# Patient Record
Sex: FEMALE | Race: WHITE | NOT HISPANIC OR LATINO
[De-identification: names, ages, dates, MRNs, and addresses within clinical notes are randomized per-mention and may not be internally consistent; named-entity substitution may affect disease eponyms.]

---
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
## 2021-02-28 ENCOUNTER — APPOINTMENT (OUTPATIENT)
Dept: MRI IMAGING | Facility: HOSPITAL | Age: 21
End: 2021-02-28
Payer: COMMERCIAL

## 2021-02-28 ENCOUNTER — OUTPATIENT (OUTPATIENT)
Dept: OUTPATIENT SERVICES | Age: 21
LOS: 1 days | End: 2021-02-28

## 2021-02-28 DIAGNOSIS — G93.5 COMPRESSION OF BRAIN: ICD-10-CM

## 2021-02-28 PROCEDURE — 72141 MRI NECK SPINE W/O DYE: CPT | Mod: 26

## 2021-02-28 PROCEDURE — 72146 MRI CHEST SPINE W/O DYE: CPT | Mod: 26

## 2021-05-15 ENCOUNTER — OUTPATIENT (OUTPATIENT)
Dept: OUTPATIENT SERVICES | Age: 21
LOS: 1 days | End: 2021-05-15

## 2021-05-15 VITALS
DIASTOLIC BLOOD PRESSURE: 73 MMHG | HEART RATE: 60 BPM | RESPIRATION RATE: 18 BRPM | OXYGEN SATURATION: 98 % | WEIGHT: 103.4 LBS | SYSTOLIC BLOOD PRESSURE: 113 MMHG | HEIGHT: 61.57 IN

## 2021-05-15 DIAGNOSIS — Z98.890 OTHER SPECIFIED POSTPROCEDURAL STATES: Chronic | ICD-10-CM

## 2021-05-15 DIAGNOSIS — Z86.69 PERSONAL HISTORY OF OTHER DISEASES OF THE NERVOUS SYSTEM AND SENSE ORGANS: ICD-10-CM

## 2021-05-15 DIAGNOSIS — Z92.89 PERSONAL HISTORY OF OTHER MEDICAL TREATMENT: Chronic | ICD-10-CM

## 2021-05-15 DIAGNOSIS — G91.9 HYDROCEPHALUS, UNSPECIFIED: ICD-10-CM

## 2021-05-15 LAB
ANION GAP SERPL CALC-SCNC: 10 MMOL/L — SIGNIFICANT CHANGE UP (ref 7–14)
BLD GP AB SCN SERPL QL: NEGATIVE — SIGNIFICANT CHANGE UP
BUN SERPL-MCNC: 11 MG/DL — SIGNIFICANT CHANGE UP (ref 7–23)
CALCIUM SERPL-MCNC: 9.8 MG/DL — SIGNIFICANT CHANGE UP (ref 8.4–10.5)
CHLORIDE SERPL-SCNC: 102 MMOL/L — SIGNIFICANT CHANGE UP (ref 98–107)
CO2 SERPL-SCNC: 26 MMOL/L — SIGNIFICANT CHANGE UP (ref 22–31)
CREAT SERPL-MCNC: 0.79 MG/DL — SIGNIFICANT CHANGE UP (ref 0.5–1.3)
GLUCOSE SERPL-MCNC: 85 MG/DL — SIGNIFICANT CHANGE UP (ref 70–99)
HCG SERPL-ACNC: <5 MIU/ML — SIGNIFICANT CHANGE UP
HCT VFR BLD CALC: 40.2 % — SIGNIFICANT CHANGE UP (ref 34.5–45)
HGB BLD-MCNC: 13.2 G/DL — SIGNIFICANT CHANGE UP (ref 11.5–15.5)
MCHC RBC-ENTMCNC: 29.3 PG — SIGNIFICANT CHANGE UP (ref 27–34)
MCHC RBC-ENTMCNC: 32.8 GM/DL — SIGNIFICANT CHANGE UP (ref 32–36)
MCV RBC AUTO: 89.3 FL — SIGNIFICANT CHANGE UP (ref 80–100)
NRBC # BLD: 0 /100 WBCS — SIGNIFICANT CHANGE UP
NRBC # FLD: 0 K/UL — SIGNIFICANT CHANGE UP
PLATELET # BLD AUTO: 337 K/UL — SIGNIFICANT CHANGE UP (ref 150–400)
POTASSIUM SERPL-MCNC: 4.5 MMOL/L — SIGNIFICANT CHANGE UP (ref 3.5–5.3)
POTASSIUM SERPL-SCNC: 4.5 MMOL/L — SIGNIFICANT CHANGE UP (ref 3.5–5.3)
RBC # BLD: 4.5 M/UL — SIGNIFICANT CHANGE UP (ref 3.8–5.2)
RBC # FLD: 12.4 % — SIGNIFICANT CHANGE UP (ref 10.3–14.5)
RH IG SCN BLD-IMP: POSITIVE — SIGNIFICANT CHANGE UP
SODIUM SERPL-SCNC: 138 MMOL/L — SIGNIFICANT CHANGE UP (ref 135–145)
WBC # BLD: 5.94 K/UL — SIGNIFICANT CHANGE UP (ref 3.8–10.5)
WBC # FLD AUTO: 5.94 K/UL — SIGNIFICANT CHANGE UP (ref 3.8–10.5)

## 2021-05-15 RX ORDER — ALBUTEROL 90 UG/1
2 AEROSOL, METERED ORAL
Qty: 0 | Refills: 0 | DISCHARGE

## 2021-05-15 RX ORDER — BUPROPION HYDROCHLORIDE 150 MG/1
1 TABLET, EXTENDED RELEASE ORAL
Qty: 0 | Refills: 0 | DISCHARGE

## 2021-05-15 NOTE — H&P PST ADULT - NSICDXPASTMEDICALHX_GEN_ALL_CORE_FT
PAST MEDICAL HISTORY:  Anxiety     Exercise-induced asthma     History of Chiari malformation     History of migraine headaches

## 2021-05-15 NOTE — H&P PST ADULT - NOTES
Family History: Mother no pmh, tubal ligation, Father- ACL repair, Brother 27yo- PFO, no psh , Sister 21yo- no pmh, no psh. MGM- htn, high choles., hip replacement, MGF- htn, DM, high chol., PSM- - cancer, PGF- hernia repair.  Maternal aunt- PE at age 30.   No family history of bleeding disorders or anesthesia complications

## 2021-05-15 NOTE — H&P PST ADULT - RADIOLOGY RESULTS AND INTERPRETATION
IMPRESSION:    Minimal prominence of the central canal of the mid-lower cervical and upper thoracic cord is noted, without focal discrete syrinx formation.              DEAN ALARCON MD; Resident Radiology  This document has been electronically signed.  JOSE ZAIDI MD; Attending Radiologist  This document has been electronically signed. Mar  1 2021  3:30PM

## 2021-05-15 NOTE — H&P PST ADULT - PRO ANTICIPATED DISCH DISP
home Acitretin Pregnancy And Lactation Text: This medication is Pregnancy Category X and should not be given to women who are pregnant or may become pregnant in the future. This medication is excreted in breast milk.

## 2021-05-15 NOTE — H&P PST ADULT - REASON FOR ADMISSION
Here today for presurgical assessment prior to suboccipital craniectomy, C1 laminectomy for external CHiari decompression [place]25[place]2021 [place] [facility] [place] today for presurgical assessment prior to suboccipital craniectomy, C1 laminectomy for external [place]ari decompression [place]25[place]21 [place] [facility] [place]

## 2021-05-15 NOTE — H&P PST ADULT - NSICDXPASTSURGICALHX_GEN_ALL_CORE_FT
PAST SURGICAL HISTORY:  H/O surgical procedure mass removed from shoulder    History of dental surgery emergency delirium

## 2021-05-15 NOTE — H&P PST ADULT - HISTORY OF PRESENT ILLNESS
19yo here for PSt. She has a history of migraine headaches, anxiety, exercise induced asthma and a PFO which closed at approximately age 14yo. She had a brain MRI which was significant for a Chiari malformation. She was evaluated by Dr. Lopez and is now here prior to Chiari decompression.  In February 2021 she had spine MRI which showed no evidence os syrinx. She has had a mass removed her shoulder with no reported complications but she did experience symptoms consistent with emergence delirium after dental surgery.  No recent fever or s/s illness. No known exposure to Covid 19

## 2021-05-15 NOTE — H&P PST ADULT - NS PRO REFERRAL CMGT
This CCLS provided psychological preparation through pictures and explanation of hospital routines. This CCLS provided pt./family with information about admission to hospital. Pt. appeared to be coping well. Pt. verbalized developmentally appropriate understanding of day of surgery.

## 2021-05-15 NOTE — H&P PST ADULT - NSANTHOSAYNRD_GEN_A_CORE
No. DALE screening performed.  STOP BANG Legend: 0-2 = LOW Risk; 3-4 = INTERMEDIATE Risk; 5-8 = HIGH Risk

## 2021-05-15 NOTE — H&P PST ADULT - NSICDXPROBLEM_GEN_ALL_CORE_FT
PROBLEM DIAGNOSES  Problem: History of Chiari malformation  Assessment and Plan: Scheduled for suboccipital craniectomy, C1 Laminectomy for extradural Chiari decompression scheduled for 5/25/2021 at [facility] [place].  CBC, BMP, HCG and TYpe and Screen sent  Given urine cup to bring sample on DOS  COVID PCR scheduled for 5/22/21

## 2021-05-15 NOTE — H&P PST ADULT - CARDIOVASCULAR COMMENTS
History of PFO which spontaneously closed and seen by cardiology x 1 for palpitations- exam wnl, most likely due to panic attacks.

## 2021-05-22 ENCOUNTER — APPOINTMENT (OUTPATIENT)
Dept: DISASTER EMERGENCY | Facility: CLINIC | Age: 21
End: 2021-05-22

## 2021-05-22 DIAGNOSIS — Z01.818 ENCOUNTER FOR OTHER PREPROCEDURAL EXAMINATION: ICD-10-CM

## 2021-05-22 LAB — SARS-COV-2 N GENE NPH QL NAA+PROBE: NOT DETECTED

## 2021-05-24 ENCOUNTER — TRANSCRIPTION ENCOUNTER (OUTPATIENT)
Age: 21
End: 2021-05-24

## 2021-05-25 ENCOUNTER — TRANSCRIPTION ENCOUNTER (OUTPATIENT)
Age: 21
End: 2021-05-25

## 2021-05-25 ENCOUNTER — INPATIENT (INPATIENT)
Age: 21
LOS: 0 days | Discharge: HOME | End: 2021-05-26
Attending: NEUROLOGICAL SURGERY | Admitting: NEUROLOGICAL SURGERY
Payer: COMMERCIAL

## 2021-05-25 VITALS
RESPIRATION RATE: 20 BRPM | OXYGEN SATURATION: 100 % | TEMPERATURE: 99 F | DIASTOLIC BLOOD PRESSURE: 60 MMHG | SYSTOLIC BLOOD PRESSURE: 119 MMHG | WEIGHT: 103.4 LBS | HEIGHT: 61.57 IN | HEART RATE: 90 BPM

## 2021-05-25 DIAGNOSIS — Z92.89 PERSONAL HISTORY OF OTHER MEDICAL TREATMENT: Chronic | ICD-10-CM

## 2021-05-25 DIAGNOSIS — Z98.890 OTHER SPECIFIED POSTPROCEDURAL STATES: Chronic | ICD-10-CM

## 2021-05-25 DIAGNOSIS — G91.9 HYDROCEPHALUS, UNSPECIFIED: ICD-10-CM

## 2021-05-25 LAB
BASOPHILS # BLD AUTO: 0.02 K/UL — SIGNIFICANT CHANGE UP (ref 0–0.2)
BASOPHILS NFR BLD AUTO: 0.2 % — SIGNIFICANT CHANGE UP (ref 0–2)
EOSINOPHIL # BLD AUTO: 0 K/UL — SIGNIFICANT CHANGE UP (ref 0–0.5)
EOSINOPHIL NFR BLD AUTO: 0 % — SIGNIFICANT CHANGE UP (ref 0–6)
HCG UR QL: NEGATIVE — SIGNIFICANT CHANGE UP
HCT VFR BLD CALC: 34.2 % — LOW (ref 34.5–45)
HGB BLD-MCNC: 11.3 G/DL — LOW (ref 11.5–15.5)
IANC: 11.22 K/UL — HIGH (ref 1.5–8.5)
IMM GRANULOCYTES NFR BLD AUTO: 0.7 % — SIGNIFICANT CHANGE UP (ref 0–1.5)
LYMPHOCYTES # BLD AUTO: 0.7 K/UL — LOW (ref 1–3.3)
LYMPHOCYTES # BLD AUTO: 5.6 % — LOW (ref 13–44)
MCHC RBC-ENTMCNC: 29.4 PG — SIGNIFICANT CHANGE UP (ref 27–34)
MCHC RBC-ENTMCNC: 33 GM/DL — SIGNIFICANT CHANGE UP (ref 32–36)
MCV RBC AUTO: 89.1 FL — SIGNIFICANT CHANGE UP (ref 80–100)
MONOCYTES # BLD AUTO: 0.49 K/UL — SIGNIFICANT CHANGE UP (ref 0–0.9)
MONOCYTES NFR BLD AUTO: 3.9 % — SIGNIFICANT CHANGE UP (ref 2–14)
NEUTROPHILS # BLD AUTO: 11.22 K/UL — HIGH (ref 1.8–7.4)
NEUTROPHILS NFR BLD AUTO: 89.6 % — HIGH (ref 43–77)
NRBC # BLD: 0 /100 WBCS — SIGNIFICANT CHANGE UP
NRBC # FLD: 0 K/UL — SIGNIFICANT CHANGE UP
PLATELET # BLD AUTO: 258 K/UL — SIGNIFICANT CHANGE UP (ref 150–400)
RBC # BLD: 3.84 M/UL — SIGNIFICANT CHANGE UP (ref 3.8–5.2)
RBC # FLD: 12.2 % — SIGNIFICANT CHANGE UP (ref 10.3–14.5)
WBC # BLD: 12.52 K/UL — HIGH (ref 3.8–10.5)
WBC # FLD AUTO: 12.52 K/UL — HIGH (ref 3.8–10.5)

## 2021-05-25 PROCEDURE — 99291 CRITICAL CARE FIRST HOUR: CPT

## 2021-05-25 RX ORDER — ONDANSETRON 8 MG/1
4 TABLET, FILM COATED ORAL EVERY 8 HOURS
Refills: 0 | Status: DISCONTINUED | OUTPATIENT
Start: 2021-05-25 | End: 2021-05-25

## 2021-05-25 RX ORDER — SODIUM CHLORIDE 9 MG/ML
1000 INJECTION, SOLUTION INTRAVENOUS
Refills: 0 | Status: DISCONTINUED | OUTPATIENT
Start: 2021-05-25 | End: 2021-05-26

## 2021-05-25 RX ORDER — HYDROMORPHONE HYDROCHLORIDE 2 MG/ML
0.5 INJECTION INTRAMUSCULAR; INTRAVENOUS; SUBCUTANEOUS
Refills: 0 | Status: DISCONTINUED | OUTPATIENT
Start: 2021-05-25 | End: 2021-05-25

## 2021-05-25 RX ORDER — FENTANYL CITRATE 50 UG/ML
25 INJECTION INTRAVENOUS
Refills: 0 | Status: DISCONTINUED | OUTPATIENT
Start: 2021-05-25 | End: 2021-05-25

## 2021-05-25 RX ORDER — DIAZEPAM 5 MG
5 TABLET ORAL EVERY 6 HOURS
Refills: 0 | Status: DISCONTINUED | OUTPATIENT
Start: 2021-05-25 | End: 2021-05-26

## 2021-05-25 RX ORDER — ONDANSETRON 8 MG/1
4 TABLET, FILM COATED ORAL EVERY 8 HOURS
Refills: 0 | Status: DISCONTINUED | OUTPATIENT
Start: 2021-05-25 | End: 2021-05-26

## 2021-05-25 RX ORDER — ONDANSETRON 8 MG/1
4 TABLET, FILM COATED ORAL EVERY 6 HOURS
Refills: 0 | Status: DISCONTINUED | OUTPATIENT
Start: 2021-05-25 | End: 2021-05-25

## 2021-05-25 RX ORDER — BUPROPION HYDROCHLORIDE 150 MG/1
100 TABLET, EXTENDED RELEASE ORAL ONCE
Refills: 0 | Status: DISCONTINUED | OUTPATIENT
Start: 2021-05-25 | End: 2021-05-26

## 2021-05-25 RX ORDER — OXYCODONE HYDROCHLORIDE 5 MG/1
5 TABLET ORAL EVERY 4 HOURS
Refills: 0 | Status: DISCONTINUED | OUTPATIENT
Start: 2021-05-25 | End: 2021-05-26

## 2021-05-25 RX ORDER — CEFAZOLIN SODIUM 1 G
1410 VIAL (EA) INJECTION EVERY 8 HOURS
Refills: 0 | Status: COMPLETED | OUTPATIENT
Start: 2021-05-25 | End: 2021-05-26

## 2021-05-25 RX ORDER — ONDANSETRON 8 MG/1
4 TABLET, FILM COATED ORAL ONCE
Refills: 0 | Status: DISCONTINUED | OUTPATIENT
Start: 2021-05-25 | End: 2021-05-25

## 2021-05-25 RX ORDER — ACETAMINOPHEN 500 MG
650 TABLET ORAL EVERY 6 HOURS
Refills: 0 | Status: DISCONTINUED | OUTPATIENT
Start: 2021-05-25 | End: 2021-05-26

## 2021-05-25 RX ADMIN — Medication 5 MILLIGRAM(S): at 20:45

## 2021-05-25 RX ADMIN — SODIUM CHLORIDE 75 MILLILITER(S): 9 INJECTION, SOLUTION INTRAVENOUS at 14:30

## 2021-05-25 RX ADMIN — Medication 650 MILLIGRAM(S): at 20:44

## 2021-05-25 RX ADMIN — Medication 5 MILLIGRAM(S): at 15:14

## 2021-05-25 RX ADMIN — OXYCODONE HYDROCHLORIDE 5 MILLIGRAM(S): 5 TABLET ORAL at 18:40

## 2021-05-25 RX ADMIN — ONDANSETRON 8 MILLIGRAM(S): 8 TABLET, FILM COATED ORAL at 23:48

## 2021-05-25 RX ADMIN — Medication 650 MILLIGRAM(S): at 21:30

## 2021-05-25 RX ADMIN — OXYCODONE HYDROCHLORIDE 5 MILLIGRAM(S): 5 TABLET ORAL at 18:10

## 2021-05-25 RX ADMIN — Medication 141 MILLIGRAM(S): at 22:27

## 2021-05-25 NOTE — H&P ADULT - ATTENDING COMMENTS
Patient with chiari malformation admitted to PICU POD 0 from chiari extradural decompression, Uncomplicated anesthetic and surgery with minimal EBL. Extubated post operatively and on admission to PICU neuro exam fully intact with 5/5 strength, some neck pain, otherwise no complaints. Will monitor in PICU with Q1H neuro exams, pain control, OOB and walk around unit, likely home tomorrow. Patient with chiari malformation admitted to PICU POD 0 from chiari extradural decompression, Uncomplicated anesthetic and surgery, transfused 1 unit PRBC. Extubated post operatively and on admission to PICU neuro exam fully intact with 5/5 strength, some neck pain, otherwise no complaints. Will monitor in PICU with Q1H neuro exams, pain control, OOB and walk around unit, likely home tomorrow.

## 2021-05-25 NOTE — H&P ADULT - ASSESSMENT
Sera is a 19 y/o F pmhx history of migraine headaches, anxiety, exercise induced asthma here for decompression of Chiari type I / C1 laminectomy. Procedure completed without any major complications, she is well appearing however requires ICU monitoring for neurological checks. She has mild headache and responds well to PO pain medication. She can possibly be discharged tomorrow if pain is well managed and she remains stable.    #Resp  - RA    #CV  - Stable    #Neuro  - OOB  - neuro checks q2  - Valium PRN pain  - Oxycodone PRN moderate pain  - Wellbutrin 100mg (home med)    #ID  - Ancef x 24 hours   Sera is a 19 y/o F pmhx history of migraine headaches, anxiety, exercise induced asthma here for decompression of Chiari type I / C1 laminectomy. Procedure completed without any complications, she is well appearing however requires ICU monitoring for neurological checks. She has mild headache and responds well to PO pain medication. She can possibly be discharged tomorrow if pain is well managed and she remains stable.    #Resp  - RA    #CV  - Stable    #Neuro  - OOB  - neuro checks q2  - Valium PRN pain  - Oxycodone PRN moderate pain  - Wellbutrin 100mg (home med)    #ID  - Ancef x 24 hours

## 2021-05-25 NOTE — H&P ADULT - NSHPPHYSICALEXAM_GEN_ALL_CORE
Const:  Alert and interactive, no acute distress  HEENT: Normocephalic, atraumatic; TMs WNL; Moist mucosa; Oropharynx clear; Neck supple  Lymph: No significant lymphadenopathy  CV: Heart regular, normal S1/2, no murmurs; Extremities WWPx4  Pulm: Lungs clear to auscultation bilaterally  GI: Abdomen non-distended; No organomegaly, no tenderness, no masses  Skin: No rash noted  Neuro: Alert; Normal tone; coordination appropriate for age. Pupils 3mm however reactive Const:  Alert and interactive, no acute distress  HEENT: Normocephalic, atraumatic; TMs WNL; Moist mucosa; Oropharynx clear; Neck supple  Lymph: No significant lymphadenopathy  CV: Heart regular, normal S1/2, no murmurs; Extremities WWPx4  Pulm: Lungs clear to auscultation bilaterally  GI: Abdomen non-distended; No organomegaly, no tenderness, no masses  Skin: No rash noted  Neuro: Alert; Normal tone; coordination appropriate for age. Pupils 3mm reactive, 5/5 strength, cranial nerves intact, can turn head side to side

## 2021-05-25 NOTE — DISCHARGE NOTE PROVIDER - HOSPITAL COURSE
Sera is a 19 y/o F pmhx history of migraine headaches, anxiety, exercise induced asthma here for decompression of Chiari type I. She had long standing history of headaches and migraines. She had a brain MRI which was significant for a Chiari malformation about 4/5 months ago. In February 2021 she had spine MRI which showed no evidence os syrinx. Procedure completed without any complications, received 1 unit prbc. Patient reports 6/10 headache however improved from before. Denies nausea / vomiting.    PICU (5/25 - 5/26)  Neuro: remained stable, oral pain meds given  Heme: 1 unit prbc given, repeat cbc stable  ID: ancef x 24 hours   Sera is a 19 y/o F pmhx history of migraine headaches, anxiety, exercise induced asthma here for decompression of Chiari type I. She had long standing history of headaches and migraines. She had a brain MRI which was significant for a Chiari malformation about 4/5 months ago. In February 2021 she had spine MRI which showed no evidence os syrinx.  She underwent an SOC, C1 laminectomy, she tolerated procedure well.  Patient reports 6/10 headache however improved from before. Denies nausea / vomiting.    PICU (5/25 - 5/26)  Neuro: remained stable, oral pain meds given  Heme: 1 unit prbc given, repeat cbc stable  ID: ancef x 24 hours    Patient is tolerating regular diet, ambulating independently and is stable for discharge home.

## 2021-05-25 NOTE — DISCHARGE NOTE PROVIDER - NSDCMRMEDTOKEN_GEN_ALL_CORE_FT
Albuterol (Eqv-ProAir HFA) 90 mcg/inh inhalation aerosol: 2 puff(s) inhaled prior to sports   Wellbutrin 100 mg oral tablet: 1 tab(s) orally once a day   acetaminophen 325 mg oral tablet: 2 tab(s) orally every 6 hours, As needed, Temp greater or equal to 38 C (100.4 F), Mild Pain (1 - 3)  Albuterol (Eqv-ProAir HFA) 90 mcg/inh inhalation aerosol: 2 puff(s) inhaled prior to sports   diazePAM 5 mg oral tablet: 1 tab(s) orally every 6 hours MDD:4 tabs  oxyCODONE 5 mg oral tablet: 1 tab(s) orally every 4 hours, As needed, Severe Pain (7 - 10) MDD:6 tabs  Wellbutrin 100 mg oral tablet: 1 tab(s) orally once a day

## 2021-05-25 NOTE — DISCHARGE NOTE PROVIDER - NSDCCPCAREPLAN_GEN_ALL_CORE_FT
PRINCIPAL DISCHARGE DIAGNOSIS  Diagnosis: Chiari malformation type I  Assessment and Plan of Treatment:

## 2021-05-25 NOTE — PROGRESS NOTE PEDS - SUBJECTIVE AND OBJECTIVE BOX
POC- s/p SOC, C1 laminectomy for chiari malformation    Patient seen and examined with mother bedside, reports mild incisional pain, denies any other complaints, tolerating diet, voiding freely.    HPI:  21yo here for PSt. She has a history of migraine headaches, anxiety, exercise induced asthma and a PFO which closed at approximately age 12yo. She had a brain MRI which was significant for a Chiari malformation. She was evaluated by Dr. Lopez and is now here prior to Chiari decompression.  In February 2021 she had spine MRI which showed no evidence os syrinx. She has had a mass removed her shoulder with no reported complications but she did experience symptoms consistent with emergence delirium after dental surgery.  No recent fever or s/s illness. No known exposure to Covid 19    PAST MEDICAL & SURGICAL HISTORY:  History of migraine headaches  History of Chiari malformation  Exercise-induced asthma  Anxiety  H/O surgical procedure mass removed from shoulder  History of dental surgery  emergency delirium    PHYSICAL EXAM:  AA&0 x 3, speach clear, follows commands, PERRL  CN 2-12 grossly intact  Motor- strength 5/5 throughout  Muscle Tone- normal  Sensory - intact to light touch  Incision site C/D/I    Diet:  Regular ( x )  NPO       (  )    Drains:  ventriculostomy   (  )  Lumbar drain       (  )  BRIDGET drain               (  )  Hemovac              (  )    Vital Signs Last 24 Hrs  T(C): 36.7 (25 May 2021 16:00), Max: 37.4 (25 May 2021 11:02)  T(F): 98.1 (25 May 2021 16:00), Max: 98.2 (25 May 2021 14:20)  HR: 68 (25 May 2021 17:00) (60 - 94)  BP: 102/60 (25 May 2021 17:00) (97/65 - 122/60)  BP(mean): 72 (25 May 2021 15:45) (72 - 84)  RR: 15 (25 May 2021 17:00) (13 - 20)  SpO2: 100% (25 May 2021 17:00) (98% - 100%)  I&O's Summary    25 May 2021 07:01  -  25 May 2021 17:42  --------------------------------------------------------  IN: 680 mL / OUT: 300 mL / NET: 380 mL      MEDICATIONS  (STANDING):  ceFAZolin  IV Intermittent - Peds 1410 milliGRAM(s) IV Intermittent every 8 hours  diazepam  Oral Tab/Cap - Peds 5 milliGRAM(s) Oral every 6 hours  sodium chloride 0.9%. - Pediatric 1000 milliLiter(s) (75 mL/Hr) IV Continuous <Continuous>    MEDICATIONS  (PRN):  acetaminophen   Oral Tab/Cap - Peds. 650 milliGRAM(s) Oral every 6 hours PRN Temp greater or equal to 38 C (100.4 F), Mild Pain (1 - 3)  fentaNYL    IV Push - Peds 25 MICROGram(s) IV Push every 10 minutes PRN Moderate Pain (4 - 6)  HYDROmorphone IV Push - Peds 0.5 milliGRAM(s) IV Push every 15 minutes PRN Severe Pain (7 - 10)  ondansetron IV Intermittent - Peds 4 milliGRAM(s) IV Intermittent once PRN Nausea and/or Vomiting  oxyCODONE   IR Oral Tab/Cap - Peds 5 milliGRAM(s) Oral every 4 hours PRN Severe Pain (7 - 10)    LABS:

## 2021-05-25 NOTE — ASU PATIENT PROFILE, PEDIATRIC - PMH
Anxiety    Exercise-induced asthma    History of Chiari malformation    History of migraine headaches

## 2021-05-25 NOTE — H&P ADULT - HISTORY OF PRESENT ILLNESS
Sera is a 19 y/o F pmhx history of migraine headaches, anxiety, exercise induced asthma here for decompression of Chiari type I. She had long standing history of headaches and migraines. She had a brain MRI which was significant for a Chiari malformation about 4/5 months ago. In February 2021 she had spine MRI which showed no evidence os syrinx. Procedure completed without any complications. Patient reports 6/10 headache however improved from before. Denies nausea / vomiting. Sera is a 19 y/o F pmhx history of migraine headaches, anxiety, exercise induced asthma here for decompression of Chiari type I. She had long standing history of headaches and migraines. She had a brain MRI which was significant for a Chiari malformation about 4/5 months ago. In February 2021 she had spine MRI which showed no evidence os syrinx. Procedure completed without any complications, received 1 unit prbc. Patient reports 6/10 headache however improved from before. Denies nausea / vomiting. Sera is a 21 y/o F pmhx history of migraine headaches, anxiety, exercise induced asthma here for decompression of Chiari type I. She had long standing history of headaches and migraines. She had a brain MRI which was significant for a Chiari malformation about 4/5 months ago. In February 2021 she had spine MRI which showed no evidence of syrinx. Procedure completed without any complications, received 1 unit prbc. Patient reports 6/10 headache however improved from before. Denies nausea / vomiting.

## 2021-05-25 NOTE — DISCHARGE NOTE PROVIDER - CARE PROVIDER_API CALL
Storm Lopez)  Neurosurgery; Pediatric Neurosurgery  [address] [place] [address], [address]  [place] [address]  Phone: [phone]  Fax: [phone]  Follow Up Time:

## 2021-05-25 NOTE — DISCHARGE NOTE PROVIDER - NSDCFUADDINST_GEN_ALL_CORE_FT
1. Remove top surgical dressing on post operative day 3 unless it was removed by the surgical team prior to your discharge. Incision should be left uncovered after day 3.   2. Begin showering with shampoo on post operative day 4. Avoid long soaks and do not submerge incision in bathtub. Regular shower only and allow soap and water to run over the incision. Pat incision area dry with clean towel- do not scrub. Please shower regularly to ensure incision stays clean to avoid post operative infections.   3. Notify your surgeon if you notice increased redness, drainage or you notice incision area opening.   4. Return to ER immediatley for high fevers, severe headache, vomiting, lethargy or  weakness  5. Please call your neurosurgeon following discharge to make follow up appointment in 1 week after discharge unless otherwise specified.   6. Post operative pain medication are sent to [facility](unless otherwise specified)- Located in [facility] [employer]. All post operative prescriptions should be picked up before departing the hospital  7. Ambulate as tolerate. Continue with all "activities of daily living". Avoid strenuous activity or lifting more than 10 pounds until cleared for additional activity at your follow up appointment  8. Do not return to work or school until cleared by your neurosurgeon at your follow up visit unless specified to you during your hospital stay

## 2021-05-26 ENCOUNTER — TRANSCRIPTION ENCOUNTER (OUTPATIENT)
Age: 21
End: 2021-05-26

## 2021-05-26 VITALS
OXYGEN SATURATION: 99 % | TEMPERATURE: 99 F | RESPIRATION RATE: 22 BRPM | DIASTOLIC BLOOD PRESSURE: 61 MMHG | SYSTOLIC BLOOD PRESSURE: 105 MMHG | HEART RATE: 87 BPM

## 2021-05-26 PROCEDURE — 99238 HOSP IP/OBS DSCHRG MGMT 30/<: CPT | Mod: GC

## 2021-05-26 RX ORDER — ACETAMINOPHEN 500 MG
2 TABLET ORAL
Qty: 0 | Refills: 0 | DISCHARGE
Start: 2021-05-26

## 2021-05-26 RX ORDER — OXYCODONE HYDROCHLORIDE 5 MG/1
1 TABLET ORAL
Qty: 18 | Refills: 0
Start: 2021-05-26 | End: 2021-05-28

## 2021-05-26 RX ORDER — DIAZEPAM 5 MG
1 TABLET ORAL
Qty: 12 | Refills: 0
Start: 2021-05-26 | End: 2021-05-28

## 2021-05-26 RX ADMIN — OXYCODONE HYDROCHLORIDE 5 MILLIGRAM(S): 5 TABLET ORAL at 14:18

## 2021-05-26 RX ADMIN — OXYCODONE HYDROCHLORIDE 5 MILLIGRAM(S): 5 TABLET ORAL at 14:51

## 2021-05-26 RX ADMIN — Medication 650 MILLIGRAM(S): at 03:41

## 2021-05-26 RX ADMIN — Medication 650 MILLIGRAM(S): at 08:53

## 2021-05-26 RX ADMIN — Medication 5 MILLIGRAM(S): at 03:00

## 2021-05-26 RX ADMIN — Medication 5 MILLIGRAM(S): at 08:53

## 2021-05-26 RX ADMIN — Medication 650 MILLIGRAM(S): at 09:30

## 2021-05-26 RX ADMIN — Medication 650 MILLIGRAM(S): at 02:59

## 2021-05-26 RX ADMIN — ONDANSETRON 8 MILLIGRAM(S): 8 TABLET, FILM COATED ORAL at 14:39

## 2021-05-26 RX ADMIN — Medication 141 MILLIGRAM(S): at 13:35

## 2021-05-26 RX ADMIN — Medication 141 MILLIGRAM(S): at 05:56

## 2021-05-26 NOTE — DISCHARGE NOTE NURSING/CASE MANAGEMENT/SOCIAL WORK - PATIENT PORTAL LINK FT
You can access the FollowMyHealth Patient Portal offered by [facility] by registering at the following website: [email or web address]. By joining [employer] [facility]’s FollowMyHealth portal, you will also be able to view your health information using other applications (apps) compatible with our system.

## 2021-05-26 NOTE — PROGRESS NOTE ADULT - SUBJECTIVE AND OBJECTIVE BOX
POST ANESTHESIA EVALUATION    20y Female POSTOP DAY 1 S/P Suboccipital Craniotomy    MENTAL STATUS: Patient participation [ x ] Awake     [  ] Arousable     [  ] Sedated    AIRWAY PATENCY: [ x ] Satisfactory  [  ] Other:     Vital Signs Last 24 Hrs  T(C): 36.8 (26 May 2021 05:00), Max: 37.4 (25 May 2021 11:02)  T(F): 98.2 (26 May 2021 05:00), Max: 98.6 (25 May 2021 20:00)  HR: 61 (26 May 2021 08:00) (54 - 94)  BP: 105/62 (26 May 2021 08:00) (97/65 - 122/60)  BP(mean): 71 (26 May 2021 08:00) (64 - 84)  RR: 18 (26 May 2021 08:00) (13 - 20)  SpO2: 97% (26 May 2021 08:00) (96% - 100%)        NAUSEA/ VOMITTING:  [ x ] NONE  [  ] CONTROLLED [  ] OTHER     PAIN: [x  ] CONTROLLED WITH CURRENT REGIMEN  [  ] OTHER    [ x ] NO APPARENT ANESTHESIA COMPLICATIONS      Comments:

## 2021-05-26 NOTE — PATIENT PROFILE PEDIATRIC. - HIGH RISK FALLS INTERVENTIONS (SCORE 12 AND ABOVE)
Orientation to room/Bed in low position, brakes on/Side rails x 2 or 4 up, assess large gaps, such that a patient could get extremity or other body part entrapped, use additional safety procedures/Use of non-skid footwear for ambulating patients, use of appropriate size clothing to prevent risk of tripping/Assess eliminations need, assist as needed/Call light is within reach, educate patient/family on its functionality/Environment clear of unused equipment, furniture's in place, clear of hazards/Assess for adequate lighting, leave nightlight on/Patient and family education available to parents and patient/Document fall prevention teaching and include in plan of care/Educate patient/parents of falls protocol precautions/Check patient minimum every 1 hour/Accompany patient with ambulation/Evaluate medication administration times/Remove all unused equipment out of the room/Keep door open at all times unless specified isolation precautions are in use/Keep bed in the lowest position, unless patient is directly attended/Document in nursing narrative teaching and plan of care

## 2021-05-26 NOTE — PATIENT PROFILE PEDIATRIC. - AS SC BRADEN FRICTION
Neurosurg progress note  VITALS:  BP (!) 103/58   Pulse 90   Temp 99.1 °F (37.3 °C) (Oral)   Resp 14   Ht 6' 1\" (1.854 m)   Wt 179 lb 1.6 oz (81.2 kg)   SpO2 92%   BMI 23.63 kg/m²   24HR INTAKE/OUTPUT:    Intake/Output Summary (Last 24 hours) at 18 0815  Last data filed at 18 8020   Gross per 24 hour   Intake              240 ml   Output                1 ml   Net              239 ml     Ct Head Wo Contrast    Result Date: 12/10/2018  Patient MRN:  [id number] : 1933 Age: 81 years Gender: Male Order Date:  12/10/2018 12:30 PM EXAM: CT HEAD WO CONTRAST number of images 118 Technique: Low-dose CT  acquisition technique included one of following options; 1 . Automated exposure control, 2. Adjustment of MA and or KV according to patient's size or 3. Use of iterative reconstruction. INDICATION:  near syncpoe  COMPARISON: 2018 FINDINGS: A small subdural hematoma measuring 3 mm in thickness is identified along the falx. Small subdural hematoma measuring nearly 2.2 mm on the left cerebral convexity is identified. There is atrophy with dilated ventricles and prominence of the sulci. Confluent areas of decreased attenuation are present in the periventricular white matter and brainstem consistent with microvascular/ischemic changes. There is no midline shift. There is sinus disease in the right maxillary sinus. Small 2 to 3 mm subdural hematoma along the left cerebral convexity and falx without midline shift. Surveillance recommended. The findings were telephoned to the ER physician. Xr Chest Portable    Result Date: 12/10/2018  Patient MRN: [id number] : 1933 Age:  81 years Gender: Male Order Date: 12/10/2018 12:30 PM Exam: XR CHEST PORTABLE Number of Images: 1 view Indication:   hypotension, near syncope hypotension, near syncope Comparison: 2018 Findings: The heart is  slightly enlarged.  There has been marked improvement in the patchy changes involving both bases with resolution of the bilateral effusion. Minimal linear atelectasis noted particularly in the right base. No pneumothorax. Marked hypertrophic change  seen in the [facility] joint on the right side. The trachea is in the midline. No pneumothorax. Marked improvement in the changes involving both bases since the previous study Of October 2, 2018.      CBC:   Lab Results   Component Value Date    WBC 5.1 12/12/2018    RBC 2.65 12/12/2018    HGB 8.4 12/12/2018    HCT 26.7 12/12/2018    .8 12/12/2018    MCH 31.7 12/12/2018    MCHC 31.5 12/12/2018    RDW 22.5 12/12/2018     12/12/2018    MPV 11.5 12/12/2018     BMP:    Lab Results   Component Value Date     12/12/2018    K 3.5 12/12/2018    K 3.0 12/10/2018    CL 98 12/12/2018    CO2 27 12/12/2018    BUN 9 12/12/2018    LABALBU 2.5 12/11/2018    LABALBU 4.2 11/04/2010    CREATININE 4.2 12/12/2018    CALCIUM 7.6 12/12/2018    GFRAA 16 12/12/2018    LABGLOM 16 12/12/2018    GLUCOSE 47 12/12/2018    GLUCOSE 105 11/04/2010      levETIRAcetam  500 mg Oral BID    allopurinol  100 mg Oral Daily    atorvastatin  20 mg Oral Daily    levothyroxine  50 mcg Oral Daily    midodrine  10 mg Oral TID    sodium chloride flush  10 mL Intravenous 2 times per day     Awakens and follows commands   Assessment:  Patient Active Problem List   Diagnosis    HTN (hypertension), benign    Anemia of chronic disease    Mixed hyperlipidemia    Pulmonary hypertension (HCC)    Mild tricuspid regurgitation    Chronic systolic congestive heart failure (HCC)    Paroxysmal atrial fibrillation (HCC)    End stage renal failure on dialysis ([facility] [address]    Subdural hematoma (HCC)    Syncope and collapse     Plan:full anticoagulation contraindicated  Suzette Hanson M.D. (2) potential problem

## 2021-05-26 NOTE — PROGRESS NOTE PEDS - SUBJECTIVE AND OBJECTIVE BOX
PAST 24hr EVENTS: no issues overnight, neck stiffness improved     PHYSICAL EXAM:   Vital Signs Last 24 Hrs  T(C): 36.8 (26 May 2021 05:00), Max: 37.4 (25 May 2021 11:02)  T(F): 98.2 (26 May 2021 05:00), Max: 98.6 (25 May 2021 20:00)  HR: 56 (26 May 2021 05:00) (54 - 94)  BP: 106/59 (26 May 2021 05:00) (97/65 - 122/60)  BP(mean): 68 (26 May 2021 05:00) (64 - 84)  RR: 16 (26 May 2021 05:00) (13 - 20)  SpO2: 96% (26 May 2021 05:00) (96% - 100%)    AA&0 x 3, speech clear, follows commands, PERRL  CN 2-12 grossly intact  Motor- strength 5/5 throughout  Muscle Tone- normal  Sensory - intact to light touch  Incision site C/D/I    I&O's Summary    25 May 2021 07:01  -  26 May 2021 07:00  --------------------------------------------------------  IN: 1285.5 mL / OUT: 600 mL / NET: 685.5 mL                              11.3   12.52 )-----------( 258      ( 25 May 2021 22:51 )             34.2       MEDICATIONS  (STANDING):  buPROPion SR (12-Hour) Oral Tab/Cap - Peds 100 milliGRAM(s) Oral once  ceFAZolin  IV Intermittent - Peds 1410 milliGRAM(s) IV Intermittent every 8 hours  diazepam  Oral Tab/Cap - Peds 5 milliGRAM(s) Oral every 6 hours    MEDICATIONS  (PRN):  acetaminophen   Oral Tab/Cap - Peds. 650 milliGRAM(s) Oral every 6 hours PRN Temp greater or equal to 38 C (100.4 F), Mild Pain (1 - 3)  ondansetron IV Intermittent - Peds 4 milliGRAM(s) IV Intermittent every 8 hours PRN Nausea and/or Vomiting  oxyCODONE   IR Oral Tab/Cap - Peds 5 milliGRAM(s) Oral every 4 hours PRN Severe Pain (7 - 10)

## 2021-05-26 NOTE — PROGRESS NOTE PEDS - ASSESSMENT
19 y/o F, s/p SOC, C1 laminectomy for chiari malformation extradural on 5/25     PLAN:   - OOB today, needs to walk   - likely dc home later today     Case discussed with attending neurosurgeon.  
20 year old female with migraines, POD#1 s/p C1 laminectomy for decompression of Chiari Malformation type 1    PLAN:  Neurologic monitoring  Pain management - continue Valium standing; Tylenol prn  Cefazolin to complete 3 total doses  Eating well  Will most likely d/c home this afternoon after last dose of Cefazolin      
21 y/o F, s/p SOC, C1 laminectomy for chiari malformation

## 2021-05-26 NOTE — PROGRESS NOTE PEDS - SUBJECTIVE AND OBJECTIVE BOX
RESPIRATORY:  RR: 18 (05-26-21 @ 08:00) (13 - 20)  SpO2: 97% (05-26-21 @ 08:00) (96% - 100%)  Wt(kg): --    Respiratory Support:              Respiratory Medications:          Comments:      CARDIOVASCULAR  HR: 61 (05-26-21 @ 08:00) (54 - 94)  BP: 105/62 (05-26-21 @ 08:00) (97/65 - 122/60)  Wt(kg): --  ABP: --  ABP(mean): --  Wt(kg): --  CVP(mm Hg): --  CVP(cm H2O): --  [ ] NIRS:  [ ] ECHO:   Cardiac Rhythm: NSR    Cardiovascular Medications:      Comments:    HEMATOLOGIC/ONCOLOGIC:  (05-25 @ 22:51):               11.3   12.52)-----------(258                34.2   Neurophils% (auto):   89.6    manual%: x      Lymphocytes% (auto):  5.6     manual%: x      Eosinphils% (auto):   0.0     manual%: x      Bands%: x       blasts%: x              Transfusions last 24 hours:	  [ ] PRBC	[ ] Platelets    [ ] FFP	[ ] Cryoprecipitate    Hematologic/Oncologic Medications:    DVT Prophylaxis:    Comments:    INFECTIOUS DISEASE:  T(C): 36.8 (05-26-21 @ 05:00), Max: 37.4 (05-25-21 @ 11:02)  Wt(kg): --    Cultures:  RECENT CULTURES:        Medications:  ceFAZolin  IV Intermittent - Peds 1410 milliGRAM(s) IV Intermittent every 8 hours      Labs:        FLUIDS/ELECTROLYTES/NUTRITION:    Weight:  Daily Weight Gm: 04963 (25 May 2021 11:02)    05-25 @ 07:01  -  05-26 @ 07:00  --------------------------------------------------------  IN: 1285.5 mL / OUT: 600 mL / NET: 685.5 mL          Labs:        	  Gastrointestinal Medications:      Comments:      NEUROLOGY:  [ ] SBS:	[ ] JOHN-1:         [ ] BIS:    buPROPion SR (12-Hour) Oral Tab/Cap - Peds 100 milliGRAM(s) Oral once  diazepam  Oral Tab/Cap - Peds 5 milliGRAM(s) Oral every 6 hours      Adequacy of sedation and pain control has been assessed and adjusted    Comments:      OTHER MEDICATIONS:  Endocrine/Metabolic Medications:    Genitourinary Medications:    Topical/Other Medications:      Necessity of urinary, arterial, and venous catheters discussed      PHYSICAL EXAM:      IMAGING STUDIES:        Parent/Guardian is at the bedside:   [ ] Yes   [  ] No  Patient and Parent/Guardian updated as to the progress/plan of care:  [  ] Yes	[  ] No    [ ] The patient remains in critical and unstable condition, and requires ICU care and monitoring  [ ] The patient is improving but requires continued monitoring and adjustment of therapy No acute events overnight.     RESPIRATORY:  RR: 18 (05-26-21 @ 08:00) (13 - 20)  SpO2: 97% (05-26-21 @ 08:00) (96% - 100%)      Respiratory Support:  room air       Respiratory Medications:          Comments:      CARDIOVASCULAR  HR: 61 (05-26-21 @ 08:00) (54 - 94)  BP: 105/62 (05-26-21 @ 08:00) (97/65 - 122/60)  [ ] NIRS:  [ ] ECHO:   Cardiac Rhythm: NSR    Cardiovascular Medications:      Comments:    HEMATOLOGIC/ONCOLOGIC:  (05-25 @ 22:51):               11.3   12.52)-----------(258                34.2   Neurophils% (auto):   89.6    manual%: x      Lymphocytes% (auto):  5.6     manual%: x      Eosinphils% (auto):   0.0     manual%: x      Bands%: x       blasts%: x              Transfusions last 24 hours:	  [ ] PRBC	[ ] Platelets    [ ] FFP	[ ] Cryoprecipitate    Hematologic/Oncologic Medications:    DVT Prophylaxis:    Comments:    INFECTIOUS DISEASE:  T(C): 36.8 (05-26-21 @ 05:00), Max: 37.4 (05-25-21 @ 11:02)      Cultures:  RECENT CULTURES:        Medications:  ceFAZolin  IV Intermittent - Peds 1410 milliGRAM(s) IV Intermittent every 8 hours      Labs:        FLUIDS/ELECTROLYTES/NUTRITION:    Weight:  Daily Weight Gm: 28521 (25 May 2021 11:02)    05-25 @ 07:01  -  05-26 @ 07:00  --------------------------------------------------------  IN: 1285.5 mL / OUT: 600 mL / NET: 685.5 mL          Labs:        	  Gastrointestinal Medications:      Comments:      NEUROLOGY:  [ ] SBS:	[ ] JOHN-1:         [ ] BIS:    buPROPion SR (12-Hour) Oral Tab/Cap - Peds 100 milliGRAM(s) Oral once  diazepam  Oral Tab/Cap - Peds 5 milliGRAM(s) Oral every 6 hours  Tylenol prn - 2 doses    Adequacy of sedation and pain control has been assessed and adjusted    Comments:      OTHER MEDICATIONS:  Endocrine/Metabolic Medications:    Genitourinary Medications:    Topical/Other Medications:      Necessity of urinary, arterial, and venous catheters discussed      PHYSICAL EXAM:  Gen - awake, alert and active; NAD  HEENT - surgical dressing on posterior neck c/d/i  Resp - breathing comfortably; lungs clear with good air entry  CV - RRR, no murmur; distal pulses 2+; cap refill < 2 seconds  Abd - soft, NT, ND, no HSM  Ext - warm and well-perfused; nonedematous; moves all extremities spontaneously  Neuro - no gross focal deficits      IMAGING STUDIES:        Parent/Guardian is at the bedside:   [x] Yes   [  ] No  Patient and Parent/Guardian updated as to the progress/plan of care:  [x] Yes	[  ] No    [ ] The patient remains in critical and unstable condition, and requires ICU care and monitoring  [ ] The patient is improving but requires continued monitoring and adjustment of therapy

## 2021-07-21 PROBLEM — Z86.69 PERSONAL HISTORY OF OTHER DISEASES OF THE NERVOUS SYSTEM AND SENSE ORGANS: Chronic | Status: ACTIVE | Noted: 2021-05-15

## 2021-07-21 PROBLEM — J45.990 EXERCISE INDUCED BRONCHOSPASM: Chronic | Status: ACTIVE | Noted: 2021-05-15

## 2021-07-21 PROBLEM — F41.9 ANXIETY DISORDER, UNSPECIFIED: Chronic | Status: ACTIVE | Noted: 2021-05-15

## 2021-08-16 ENCOUNTER — NON-APPOINTMENT (OUTPATIENT)
Age: 21
End: 2021-08-16

## 2021-08-16 ENCOUNTER — APPOINTMENT (OUTPATIENT)
Dept: CARDIOLOGY | Facility: CLINIC | Age: 21
End: 2021-08-16
Payer: COMMERCIAL

## 2021-08-16 VITALS
SYSTOLIC BLOOD PRESSURE: 120 MMHG | RESPIRATION RATE: 16 BRPM | OXYGEN SATURATION: 100 % | HEIGHT: 62 IN | WEIGHT: 100 LBS | DIASTOLIC BLOOD PRESSURE: 78 MMHG | TEMPERATURE: 98 F | HEART RATE: 56 BPM | BODY MASS INDEX: 18.4 KG/M2

## 2021-08-16 PROCEDURE — 93000 ELECTROCARDIOGRAM COMPLETE: CPT

## 2021-08-16 PROCEDURE — 99214 OFFICE O/P EST MOD 30 MIN: CPT

## 2021-08-16 NOTE — CARDIOLOGY SUMMARY
Admit glucose 52. Glucose levels 158 and 151. Adjusted GIR  D10 to D8 with Calcium gluconate at 120 ml/kg/day    12/21 Advancing feeds, currently at 90 ml/kg/day and TPN D10W, chemstrips 88 and 85.    Plan:   Monitor glucose levels  Adjust fluids as needed   [___] : [unfilled]

## 2021-08-16 NOTE — PHYSICAL EXAM
[Well Developed] : well developed [Well Nourished] : well nourished [No Acute Distress] : no acute distress [Normal Venous Pressure] : normal venous pressure [No Carotid Bruit] : no carotid bruit [Normal S1, S2] : normal S1, S2 [No Murmur] : no murmur [No Rub] : no rub [Clear Lung Fields] : clear lung fields [Good Air Entry] : good air entry [No Respiratory Distress] : no respiratory distress  [Soft] : abdomen soft [Non Tender] : non-tender [No Masses/organomegaly] : no masses/organomegaly [Normal Bowel Sounds] : normal bowel sounds [Normal Gait] : normal gait [No Edema] : no edema [No Cyanosis] : no cyanosis [No Clubbing] : no clubbing [No Varicosities] : no varicosities [No Rash] : no rash [No Skin Lesions] : no skin lesions [Moves all extremities] : moves all extremities [No Focal Deficits] : no focal deficits [Normal Speech] : normal speech [Alert and Oriented] : alert and oriented [Normal memory] : normal memory [General Appearance - Well Developed] : well developed [Normal Appearance] : normal appearance [General Appearance - Well Nourished] : well nourished [Normal Conjunctiva] : the conjunctiva exhibited no abnormalities [Normal Oral Mucosa] : normal oral mucosa [No Oral Pallor] : no oral pallor [Normal Oropharynx] : normal oropharynx [Normal Jugular Venous V Waves Present] : normal jugular venous V waves present [5th Left ICS - MCL] : palpated at the 5th LICS in the midclavicular line [Normal] : normal [No Precordial Heave] : no precordial heave was noted [Normal Rate] : normal [Rhythm Regular] : regular [Normal S1] : normal S1 [Normal S2] : normal S2 [No Gallop] : no gallop heard [I] : a grade 1 [2+] : left 2+ [No Pitting Edema] : no pitting edema present [Respiration, Rhythm And Depth] : normal respiratory rhythm and effort [Exaggerated Use Of Accessory Muscles For Inspiration] : no accessory muscle use [Bowel Sounds] : normal bowel sounds [Abdomen Soft] : soft [Abdomen Tenderness] : non-tender [Abnormal Walk] : normal gait [Nail Clubbing] : no clubbing of the fingernails [Petechial Hemorrhages (___cm)] : no petechial hemorrhages [Cyanosis, Localized] : no localized cyanosis [Skin Color & Pigmentation] : normal skin color and pigmentation [Skin Turgor] : normal skin turgor [] : no rash [Oriented To Time, Place, And Person] : oriented to person, place, and time [No Anxiety] : not feeling anxious [Impaired Insight] : insight and judgment were intact

## 2021-08-16 NOTE — REASON FOR VISIT
[CV Risk Factors and Non-Cardiac Disease] : CV risk factors and non-cardiac disease [Consultation] : a consultation regarding [Chest Pain] : chest pain [Palpitations] : palpitations [Parent] : parent [FreeTextEntry3] : Dr. Butler [FreeTextEntry1] : murmur, Hx of PFO

## 2021-08-16 NOTE — DISCUSSION/SUMMARY
[FreeTextEntry1] : This is an 20-year-old female with past medical history remarkable for a patent foramen ovale, occasional palpitations, status post surgery for Chiari malformation, chronic headaches, dyspepsia, anxiety/panic attacks, atypical chest pain, who comes in for cardiac consultation.\par She denies chest pain, shortness of breath, dizziness or syncope.  \par The patient was placed on Wellbutrin for her anxiety.  She discontinued the medication, and prefers to be off the medication but she is complaining of nighttime palpitations occasionally.\par She wishes to remain off medication.\par Electrocardiogram done August 16, 2021 demonstrates sinus bradycardia rate of 56 bpm and is otherwise unremarkable.\par PMH:\par She was followed at [facility] for a patent foramen ovale, which her mother reports was noted to be closed between 13 and 15 years of age.\par \par The patient will followup with her primary care physician. \par Currently hemodynamically stable asymptomatic from cardiac standpoint.

## 2021-12-24 ENCOUNTER — TRANSCRIPTION ENCOUNTER (OUTPATIENT)
Age: 21
End: 2021-12-24

## 2022-01-13 ENCOUNTER — APPOINTMENT (OUTPATIENT)
Dept: OBGYN | Facility: CLINIC | Age: 22
End: 2022-01-13

## 2023-03-19 ENCOUNTER — APPOINTMENT (OUTPATIENT)
Dept: MRI IMAGING | Facility: IMAGING CENTER | Age: 23
End: 2023-03-19
Payer: COMMERCIAL

## 2023-03-19 ENCOUNTER — OUTPATIENT (OUTPATIENT)
Dept: OUTPATIENT SERVICES | Facility: HOSPITAL | Age: 23
LOS: 1 days | End: 2023-03-19
Payer: COMMERCIAL

## 2023-03-19 DIAGNOSIS — G93.5 COMPRESSION OF BRAIN: ICD-10-CM

## 2023-03-19 DIAGNOSIS — Z92.89 PERSONAL HISTORY OF OTHER MEDICAL TREATMENT: Chronic | ICD-10-CM

## 2023-03-19 DIAGNOSIS — Z98.890 OTHER SPECIFIED POSTPROCEDURAL STATES: Chronic | ICD-10-CM

## 2023-03-19 PROCEDURE — 72141 MRI NECK SPINE W/O DYE: CPT | Mod: 26

## 2023-03-19 PROCEDURE — 72146 MRI CHEST SPINE W/O DYE: CPT

## 2023-03-19 PROCEDURE — 72146 MRI CHEST SPINE W/O DYE: CPT | Mod: 26

## 2023-03-19 PROCEDURE — 72141 MRI NECK SPINE W/O DYE: CPT

## 2023-07-06 ENCOUNTER — APPOINTMENT (OUTPATIENT)
Dept: CARDIOLOGY | Facility: CLINIC | Age: 23
End: 2023-07-06

## 2023-09-28 NOTE — ASU PATIENT PROFILE, PEDIATRIC - NS PRO AFRAID ANYONE YN PEDS
I have reviewed discharge instructions and anesthesia information with the patient and boyfriend. The patient and boyfriend verbalized understanding. Patient brought down to her vehicle via wheelchair by Whit Umanzor.
John William    Age 47 y.o.    female    1970    MRN [id number]    9/28/2023  Arrival Time_____________  OR Time____________[address]     Procedure(s):  OPEN REDUCTION INTERNAL FIXATION RIGHT ANKLE                        ARTHREX NOTE: RIGHT LOWER EXTREMITY BLOCK                      General   Surgeon(s):  Levorn Sit, MD      DAY ADMIT ___  SDS/OP ___  OUTPT IN BED ___        Phone [phone] (home)     PCP _____________________ Phone_________________ Epic ( ) Epic CE ( ) Appt ________    ADDITIONAL INFO __________________________________ Cardio/Consult _____________    NOTES _____________________________________________________________________    ____________________________________________________________________________    PAT APPT DATE:________ TIME: ________  FAXED QAD: _______  (__) H&P w/ Hospitalist  __________________________________________________________________________  Preop Nurse phone screen complete: _____________  (__) CBC     (__) W/ DIFF ___________     (__) Hgb A1C    ___________  (__) CHEST X RAY   __________  (__) LIPID PROFILE  ___________  (__) EKG   __________  (__) PT-INR / APTT  ___________  (__) PFT's   __________  (__) BMP   ___________  (__) CAROTIDS  __________  (__) CMP   ___________  (__) VEIN MAPPING  __________  (__) U/A   ___________  (__) HISTORY & PHYSICAL __________  (__) URINE C & S  ___________  (__) CARDIAC CLEARANCE __________  (__) U/A W/ FLEX  ___________  (__) PULM.  CLEARANCE __________  (__) SERUM PREGNANCY ___________  (__) Check Epic DOS orders __________  (__) TYPE & SCREEN __________repeat ( ) (__)  __________________ __________  (__) Albumin / Prealbumin ___________  (__)  __________________ __________  (__) TRANSFERRIN  ___________  (__)  __________________ __________  (__) LIVER PROFILE  ___________  (__)  __________________ __________  (__) MRSA NASAL SWAB ___________  (__) URINE PREG DOS __________  (__) SED RATE  ___________  (__) BLOOD SUGAR
Patient tolerating PO fluids. Significant other at bedside.   Patient passed PT
Physical Therapy Evaluation and Treatment    Name: Juan Ramon Live YOB: 1970  MRN: [id number]  Date of Evaluation: 2023     Patient Diagnosis(es): There were no encounter diagnoses. Past Medical History:  has a past medical history of Bimalleolar ankle fracture, Chronic back pain, Needle phobia, Tobacco abuse, and Wears glasses. Past Surgical History:  has a past surgical history that includes Hysterectomy ();  section (); eye surgery; Vein Surgery (Bilateral, 2016); and Tonsillectomy. [facility] score  [facility] 6 Clicks Inpatient Mobility:  -PAC Basic Mobility - Inpatient   How much help is needed turning from your back to your side while in a flat bed without using bedrails?: None  How much help is needed moving from lying on your back to sitting on the side of a flat bed without using bedrails?: None  How much help is needed moving to and from a bed to a chair?: A Little  How much help is needed standing up from a chair using your arms?: A Little  How much help is needed walking in hospital room?: A Little  How much help is needed climbing 3-5 steps with a railing?: A Little  [facility] Inpatient Mobility Raw Score : 20  AM-PAC Inpatient T-Scale Score : 47.67  Mobility Inpatient CMS 0-100% Score: 35.83  Mobility Inpatient CMS G-Code Modifier : CJ    Surgery: R ankle ORIF    Restrictions: Full Weight Bearing as Tolerated in walking boot    Assessment:   Pt seen for PT evaluation POD#0 s/p R ankle ORIF with orders for FWBAT in boot. Pt fell down stairs  and sustained bimalleolar ankle fracture. Pt currently requires no for bed mobility, SBA for transfers, SBA for ambulation with axillary crutches with cues and education on gait pattern. Pt demo good safety with use of axillary crutches. No further PT needs at this time. Recommend Home with PRN assist and outpatient PT when cleared by surgeon.     Impairments noted: pain limiting function, decreased functional
Preprocedure completed, see epic. Consents signed, IV placed, belongings collected.
Pt brought to PACU. Report obtained from OR RN and anesthesia.  Pt placed on monitor NSR and O2 at 2L NC.
Surgery Date and Time: 9/28/23 @ 11:00 am     Arrival Time:  09:00 am    The instructions given when and if a patient needs to stop oral intake prior to surgery varies. Follow the instructions you were given by your surgeon or RN during   preop call. _X_Nothing to eat or to drink after Midnight the night before the surgery. NO gum, mints, candy or ice chips day of surgery. Aspirin, Ibuprofen, Advil, Naproxen, Vitamin E and other Anti-inflammatory products and supplements should be stopped for 5 -7days before surgery or as  directed     by your physician. Do not smoke or vape, and do not drink any alcoholic beverages 24 hours prior to surgery, this includes NA Beer. Refrain from using any recreational drugs,    including non-prescribed prescription drugs. You may brush your teeth and gargle the morning of surgery. DO NOT SWALLOW WATER. You MUST plan for a responsible adult to stay on site while you are here and take you home after your surgery. You will not be allowed to leave alone or drive               yourself home. It is requested someone stay with you the first 24 hrs. Your surgery will be cancelled if you do not have a ride home with a responsible adult. Please wear simple, loose-fitting clothing to the hospital. [facility] [place] not bring valuables (money, credit cards, checkbooks, etc.) Do not wear any makeup (including NO eye               makeup) and no nail polish if applicable. DO NOT wear any jewelry or body piercings day of surgery. All body piercing jewelry must be removed. If you have dentures they will be removed before going to the OR; we will provide a container. If you wear contact lenses or glasses, they will be removed; please               bring a case for them or wear glasses day of surgery.                 Please see your family doctor/pediatrician for a Preop History & Physical and/or concerning medications within 30 days of
no

## 2023-12-26 ENCOUNTER — NON-APPOINTMENT (OUTPATIENT)
Age: 23
End: 2023-12-26

## 2023-12-26 ENCOUNTER — APPOINTMENT (OUTPATIENT)
Dept: CARDIOLOGY | Facility: CLINIC | Age: 23
End: 2023-12-26
Payer: COMMERCIAL

## 2023-12-26 VITALS
SYSTOLIC BLOOD PRESSURE: 108 MMHG | WEIGHT: 110 LBS | HEART RATE: 61 BPM | DIASTOLIC BLOOD PRESSURE: 70 MMHG | HEIGHT: 61 IN | TEMPERATURE: 98.3 F | BODY MASS INDEX: 20.77 KG/M2 | RESPIRATION RATE: 16 BRPM | OXYGEN SATURATION: 99 %

## 2023-12-26 DIAGNOSIS — Q21.12 PATENT FORAMEN OVALE: ICD-10-CM

## 2023-12-26 DIAGNOSIS — R00.2 PALPITATIONS: ICD-10-CM

## 2023-12-26 DIAGNOSIS — R01.1 CARDIAC MURMUR, UNSPECIFIED: ICD-10-CM

## 2023-12-26 PROCEDURE — 99214 OFFICE O/P EST MOD 30 MIN: CPT | Mod: 25

## 2023-12-26 PROCEDURE — 93000 ELECTROCARDIOGRAM COMPLETE: CPT

## 2023-12-26 NOTE — REASON FOR VISIT
[CV Risk Factors and Non-Cardiac Disease] : CV risk factors and non-cardiac disease [Parent] : parent [Consultation] : a consultation regarding [Chest Pain] : chest pain [Palpitations] : palpitations [FreeTextEntry3] : Dr. Butler [FreeTextEntry1] : murmur, Hx of PFO

## 2023-12-26 NOTE — DISCUSSION/SUMMARY
[FreeTextEntry1] : This is an 23-year-old female with past medical history remarkable for a possible patent foramen ovale, history of occasional palpitations, status post surgery for Chiari malformation, chronic headaches, dyspepsia, anxiety/panic attacks, atypical chest pain, who comes in for cardiac follow-up evaluation. She denies chest pain, shortness of breath, dizziness or syncope.   Electrocardiogram done December 26, 2023 demonstrate normal sinus rhythm rate 61 bpm is otherwise unremarkable. The patient is graduating college this year and is interested in publishing. She will schedule an echo Doppler examination to evaluate her left ventricular function and chamber size, rule out mitral valve prolapse, and rule out patent foramen ovale. She is encouraged to maintain good hydration.  She is she has had no further episodes of anxiety. She exercises on a regular basis.  PMH: The patient had been placed on Wellbutrin for her anxiety.  She discontinued the medication, and prefers to be off the medication but she is complaining of nighttime palpitations occasionally. She wishes to remain off medication. Electrocardiogram done August 16, 2021 demonstrates sinus bradycardia rate of 56 bpm and is otherwise unremarkable. PMH: She was followed at [facility] for a patent foramen ovale, which her mother reports was noted to be closed between 13 and 15 years of age.  The patient will followup with her primary care physician.  Currently hemodynamically stable asymptomatic from cardiac standpoint.

## 2023-12-26 NOTE — PHYSICAL EXAM
[Well Developed] : well developed [Well Nourished] : well nourished [No Acute Distress] : no acute distress [Normal Venous Pressure] : normal venous pressure [No Carotid Bruit] : no carotid bruit [Normal S1, S2] : normal S1, S2 [No Murmur] : no murmur [No Rub] : no rub [Clear Lung Fields] : clear lung fields [Good Air Entry] : good air entry [No Respiratory Distress] : no respiratory distress  [Soft] : abdomen soft [Non Tender] : non-tender [No Masses/organomegaly] : no masses/organomegaly [Normal Bowel Sounds] : normal bowel sounds [Normal Gait] : normal gait [No Edema] : no edema [No Cyanosis] : no cyanosis [No Clubbing] : no clubbing [No Varicosities] : no varicosities [No Rash] : no rash [No Skin Lesions] : no skin lesions [Moves all extremities] : moves all extremities [No Focal Deficits] : no focal deficits [Normal Speech] : normal speech [Alert and Oriented] : alert and oriented [Normal memory] : normal memory [General Appearance - Well Developed] : well developed [Normal Appearance] : normal appearance [General Appearance - Well Nourished] : well nourished [Normal Conjunctiva] : the conjunctiva exhibited no abnormalities [Normal Oral Mucosa] : normal oral mucosa [No Oral Pallor] : no oral pallor [Normal Oropharynx] : normal oropharynx [Normal Jugular Venous V Waves Present] : normal jugular venous V waves present [5th Left ICS - MCL] : palpated at the 5th LICS in the midclavicular line [Normal] : normal [No Precordial Heave] : no precordial heave was noted [Normal Rate] : normal [Rhythm Regular] : regular [Normal S1] : normal S1 [Normal S2] : normal S2 [No Gallop] : no gallop heard [I] : a grade 1 [2+] : left 2+ [No Pitting Edema] : no pitting edema present [Respiration, Rhythm And Depth] : normal respiratory rhythm and effort [Exaggerated Use Of Accessory Muscles For Inspiration] : no accessory muscle use [Bowel Sounds] : normal bowel sounds [Abdomen Soft] : soft [Abdomen Tenderness] : non-tender [Abnormal Walk] : normal gait [Nail Clubbing] : no clubbing of the fingernails [Cyanosis, Localized] : no localized cyanosis [Petechial Hemorrhages (___cm)] : no petechial hemorrhages [Skin Color & Pigmentation] : normal skin color and pigmentation [Skin Turgor] : normal skin turgor [] : no rash [Oriented To Time, Place, And Person] : oriented to person, place, and time [Impaired Insight] : insight and judgment were intact [No Anxiety] : not feeling anxious

## 2024-01-02 ENCOUNTER — APPOINTMENT (OUTPATIENT)
Dept: CARDIOLOGY | Facility: CLINIC | Age: 24
End: 2024-01-02
Payer: COMMERCIAL

## 2024-01-02 PROCEDURE — 93306 TTE W/DOPPLER COMPLETE: CPT

## 2024-04-25 RX ORDER — NORETHINDRONE ACETATE AND ETHINYL ESTRADIOL, ETHINYL ESTRADIOL AND FERROUS FUMARATE 1MG-10(24)
KIT ORAL
Refills: 0 | Status: ACTIVE | COMMUNITY